# Patient Record
Sex: FEMALE | Race: AMERICAN INDIAN OR ALASKA NATIVE
[De-identification: names, ages, dates, MRNs, and addresses within clinical notes are randomized per-mention and may not be internally consistent; named-entity substitution may affect disease eponyms.]

---

## 2019-02-23 ENCOUNTER — HOSPITAL ENCOUNTER (EMERGENCY)
Dept: HOSPITAL 5 - ED | Age: 54
Discharge: HOME | End: 2019-02-23
Payer: MEDICAID

## 2019-02-23 VITALS — SYSTOLIC BLOOD PRESSURE: 130 MMHG | DIASTOLIC BLOOD PRESSURE: 89 MMHG

## 2019-02-23 DIAGNOSIS — K59.00: ICD-10-CM

## 2019-02-23 DIAGNOSIS — I10: ICD-10-CM

## 2019-02-23 DIAGNOSIS — J44.9: ICD-10-CM

## 2019-02-23 DIAGNOSIS — E11.9: ICD-10-CM

## 2019-02-23 DIAGNOSIS — F31.9: ICD-10-CM

## 2019-02-23 DIAGNOSIS — N76.0: Primary | ICD-10-CM

## 2019-02-23 DIAGNOSIS — M19.90: ICD-10-CM

## 2019-02-23 DIAGNOSIS — Z88.1: ICD-10-CM

## 2019-02-23 LAB
BACTERIA #/AREA URNS HPF: (no result) /HPF
BILIRUB UR QL STRIP: (no result)
BLOOD UR QL VISUAL: (no result)
HYALINE CASTS #/AREA URNS LPF: 4 /LPF
MUCOUS THREADS #/AREA URNS HPF: (no result) /HPF
PH UR STRIP: 5 [PH] (ref 5–7)
RBC #/AREA URNS HPF: 13 /HPF (ref 0–6)
UROBILINOGEN UR-MCNC: 4 MG/DL (ref ?–2)
WBC #/AREA URNS HPF: 8 /HPF (ref 0–6)

## 2019-02-23 PROCEDURE — 81001 URINALYSIS AUTO W/SCOPE: CPT

## 2019-02-23 PROCEDURE — 99283 EMERGENCY DEPT VISIT LOW MDM: CPT

## 2019-02-23 NOTE — EMERGENCY DEPARTMENT REPORT
Chief Complaint: Urogenital-Female


Stated Complaint: DISCHARGE/DISCOMFORT


Time Seen by Provider: 02/23/19 15:04





- HPI


History of Present Illness: 





SUPRAPUBIC PAIN 


VAG DX


THINKS YEAST AND BV


NO FEVER


NOT SEXUALLY ACTIVE





PMH


CMO


DM


HTN


ARTHRITIS


CEREBRAL ANEURYSM





RX


SEE PC





MSE DONE


MSE screening note: 


Focused history and physical exam performed.


Due to findings the following was ordered:











ED Disposition for MSE


Condition: Stable

## 2019-02-23 NOTE — EMERGENCY DEPARTMENT REPORT
ED Female  HPI





- General


Chief complaint: Urogenital-Female


Stated complaint: DISCHARGE/DISCOMFORT


Time Seen by Provider: 02/23/19 15:04


Source: patient


Mode of arrival: Ambulatory


Limitations: No Limitations





- History of Present Illness


Initial comments: 





Ms. Garcia is a very pleasant 52 yo female who presents with vaginal discharge

and irritation.  She is concerned for yeast infection or BV.  No abdominal pain.

 Has had constipation.


MD Complaint: vaginal discharge


-: days(s) (3)


Severity: mild


Quality: other (vaginal irritation)


Consistency: constant


Improves with: none


Worsens with: none


Are you Pregnant Now?: No





- Related Data


                                  Previous Rx's











 Medication  Instructions  Recorded  Last Taken  Type


 


Gabapentin [Neurontin] 300 mg PO TID #90 capsule 04/12/16 Unknown Rx


 


Lurasidone HCl [Latuda] 80 mg PO QDAY #30 tablet 04/12/16 Unknown Rx


 


Meloxicam 15 mg PO QDAY #10 tablet 04/12/16 Unknown Rx


 


QUEtiapine [SEROquel] 200 mg PO BID #60 tablet 04/12/16 Unknown Rx


 


amLODIPine [Norvasc] 2.5 mg PO QHS #15 tab 04/12/16 Unknown Rx


 


hydroCHLOROthiazide [HCTZ] 12.5 mg PO QDAY #30 capsule 04/12/16 Unknown Rx


 


metFORMIN [Glucophage] 500 mg PO BID #60 tablet 04/12/16 Unknown Rx


 


traZODone [Desyrel] 100 mg PO BID #20 tablet 04/12/16 Unknown Rx


 


Fluconazole [Diflucan TAB] 150 mg PO ONCE #1 tablet 02/23/19 Unknown Rx


 


metroNIDAZOLE [Flagyl] 500 mg PO Q12HR 7 Days #14 tab 02/23/19 Unknown Rx











                                    Allergies











Allergy/AdvReac Type Severity Reaction Status Date / Time


 


amoxicillin AdvReac  Shortness Verified 04/08/16 13:51





   of Breath  


 


erythromycin base AdvReac  Shortness Verified 04/08/16 13:51





   of Breath  


 


Tetracyclines AdvReac  Shortness Verified 04/08/16 13:51





   of Breath  


 


vancomycin AdvReac  Shortness Verified 04/08/16 13:51





   of Breath  














ED Review of Systems


ROS: 


Stated complaint: DISCHARGE/DISCOMFORT


Other details as noted in HPI





Constitutional: denies: fever, malaise


Gastrointestinal: constipation.  denies: abdominal pain, vomiting





ED Past Medical Hx





- Past Medical History


Hx Hypertension: Yes


Hx Heart Attack/AMI: No


Hx Diabetes: Yes


Hx Arthritis: Yes


Hx Psychiatric Treatment: Yes (bipolar, depression)


Hx Asthma: Yes


Hx COPD: Yes





- Surgical History


Additional Surgical History: hand and foot repair.  carpal tunnel repair





- Social History


Smoking Status: Never Smoker


Substance Use Type: None





- Medications


Home Medications: 


                                Home Medications











 Medication  Instructions  Recorded  Confirmed  Last Taken  Type


 


Gabapentin [Neurontin] 300 mg PO TID #90 capsule 04/12/16  Unknown Rx


 


Lurasidone HCl [Latuda] 80 mg PO QDAY #30 tablet 04/12/16  Unknown Rx


 


Meloxicam 15 mg PO QDAY #10 tablet 04/12/16  Unknown Rx


 


QUEtiapine [SEROquel] 200 mg PO BID #60 tablet 04/12/16  Unknown Rx


 


amLODIPine [Norvasc] 2.5 mg PO QHS #15 tab 04/12/16  Unknown Rx


 


hydroCHLOROthiazide [HCTZ] 12.5 mg PO QDAY #30 capsule 04/12/16  Unknown Rx


 


metFORMIN [Glucophage] 500 mg PO BID #60 tablet 04/12/16  Unknown Rx


 


traZODone [Desyrel] 100 mg PO BID #20 tablet 04/12/16  Unknown Rx


 


Fluconazole [Diflucan TAB] 150 mg PO ONCE #1 tablet 02/23/19  Unknown Rx


 


metroNIDAZOLE [Flagyl] 500 mg PO Q12HR 7 Days #14 tab 02/23/19  Unknown Rx














ED Physical Exam





- General


Limitations: No Limitations


General appearance: alert, in no apparent distress





- Head


Head exam: Present: atraumatic, normocephalic





- Eye


Eye exam: Present: normal appearance





- ENT


ENT exam: Present: mucous membranes moist





- Neck


Neck exam: Present: normal inspection, full ROM





- Respiratory


Respiratory exam: Present: normal lung sounds bilaterally.  Absent: respiratory 

distress, wheezes, rales





- GI/Abdominal


GI/Abdominal exam: Present: soft.  Absent: distended, tenderness, guarding, 

rebound





- Neurological Exam


Neurological exam: Present: alert, oriented X3





- Psychiatric


Psychiatric exam: Present: normal affect, normal mood





ED Course


                                   Vital Signs











  02/23/19





  15:05


 


Pulse Rate 95 H


 


Respiratory 16





Rate 


 


Blood Pressure 130/89


 


O2 Sat by Pulse 95





Oximetry 














ED Medical Decision Making





- Medical Decision Making





Vaginitis:  Rx: fluconazole, metronidazole


Critical care attestation.: 


If time is entered above; I have spent that time in minutes in the direct care 

of this critically ill patient, excluding procedure time.








ED Disposition


Clinical Impression: 


 Vaginitis





Disposition: DC-01 TO HOME OR SELFCARE


Is pt being admited?: No


Does the pt Need Aspirin: No


Condition: Stable


Instructions:  Bacterial Vaginosis (ED), Vulvovaginal Candidiasis (ED)


Prescriptions: 


Fluconazole [Diflucan TAB] 150 mg PO ONCE #1 tablet


metroNIDAZOLE [Flagyl] 500 mg PO Q12HR 7 Days #14 tab

## 2019-07-08 ENCOUNTER — HOSPITAL ENCOUNTER (EMERGENCY)
Dept: HOSPITAL 5 - ED | Age: 54
Discharge: HOME | End: 2019-07-08
Payer: MEDICAID

## 2019-07-08 VITALS — SYSTOLIC BLOOD PRESSURE: 147 MMHG | DIASTOLIC BLOOD PRESSURE: 87 MMHG

## 2019-07-08 DIAGNOSIS — E11.9: ICD-10-CM

## 2019-07-08 DIAGNOSIS — R11.2: ICD-10-CM

## 2019-07-08 DIAGNOSIS — Z79.899: ICD-10-CM

## 2019-07-08 DIAGNOSIS — Z88.1: ICD-10-CM

## 2019-07-08 DIAGNOSIS — M19.90: ICD-10-CM

## 2019-07-08 DIAGNOSIS — I10: ICD-10-CM

## 2019-07-08 DIAGNOSIS — G89.29: ICD-10-CM

## 2019-07-08 DIAGNOSIS — Z98.890: ICD-10-CM

## 2019-07-08 DIAGNOSIS — F31.9: ICD-10-CM

## 2019-07-08 DIAGNOSIS — R51: Primary | ICD-10-CM

## 2019-07-08 DIAGNOSIS — J44.9: ICD-10-CM

## 2019-07-08 DIAGNOSIS — E78.00: ICD-10-CM

## 2019-07-08 LAB
BASOPHILS # (AUTO): 0.1 K/MM3 (ref 0–0.1)
BASOPHILS NFR BLD AUTO: 1.2 % (ref 0–1.8)
BUN SERPL-MCNC: 15 MG/DL (ref 7–17)
BUN/CREAT SERPL: 17 %
CALCIUM SERPL-MCNC: 9.6 MG/DL (ref 8.4–10.2)
EOSINOPHIL # BLD AUTO: 0.2 K/MM3 (ref 0–0.4)
EOSINOPHIL NFR BLD AUTO: 2 % (ref 0–4.3)
HCT VFR BLD CALC: 39.1 % (ref 30.3–42.9)
HEMOLYSIS INDEX: 9
HGB BLD-MCNC: 13.4 GM/DL (ref 10.1–14.3)
LYMPHOCYTES # BLD AUTO: 4.5 K/MM3 (ref 1.2–5.4)
LYMPHOCYTES NFR BLD AUTO: 42.6 % (ref 13.4–35)
MCHC RBC AUTO-ENTMCNC: 34 % (ref 30–34)
MCV RBC AUTO: 94 FL (ref 79–97)
MONOCYTES # (AUTO): 0.7 K/MM3 (ref 0–0.8)
MONOCYTES % (AUTO): 6.3 % (ref 0–7.3)
PLATELET # BLD: 317 K/MM3 (ref 140–440)
RBC # BLD AUTO: 4.17 M/MM3 (ref 3.65–5.03)

## 2019-07-08 PROCEDURE — 99284 EMERGENCY DEPT VISIT MOD MDM: CPT

## 2019-07-08 PROCEDURE — 36415 COLL VENOUS BLD VENIPUNCTURE: CPT

## 2019-07-08 PROCEDURE — 93005 ELECTROCARDIOGRAM TRACING: CPT

## 2019-07-08 PROCEDURE — 71046 X-RAY EXAM CHEST 2 VIEWS: CPT

## 2019-07-08 PROCEDURE — 80048 BASIC METABOLIC PNL TOTAL CA: CPT

## 2019-07-08 PROCEDURE — 84484 ASSAY OF TROPONIN QUANT: CPT

## 2019-07-08 PROCEDURE — 85025 COMPLETE CBC W/AUTO DIFF WBC: CPT

## 2019-07-08 PROCEDURE — 93010 ELECTROCARDIOGRAM REPORT: CPT

## 2019-07-08 PROCEDURE — 70450 CT HEAD/BRAIN W/O DYE: CPT

## 2019-07-08 NOTE — EVENT NOTE
ED Screening Note


ED Screening Note: 








pt presents for a diffuse HA that began last night 


tingling/throbbing


N/V


PMHx of aneurysm 2016, had a coil placed, bipolar, depression, COPD, DM


no numbness or weakness


no vision changes








This initial assessment/diagnostic orders/clinical plan/treatment(s) is/are 

subject to change based on patients health status, clinical progression and re-

assessment by fellow clinical providers in the ED. Further treatment and workup 

at subsequent clinical providers discretion. Patient/guardian urged not to elope

from the ED as their condition may be serious if not clinically assessed and 

managed. 





Initial orders include: CT head

## 2019-07-08 NOTE — EMERGENCY DEPARTMENT REPORT
ED Headache HPI





- General


Chief Complaint: Headache


Stated Complaint: HEADACHE


Time Seen by Provider: 07/08/19 13:17


Source: patient


Exam Limitations: no limitations





- History of Present Illness


Initial Comments: 





h/o headaches, presents to ER for headaches for days, no fever, no neck pain, 

pain is moderate, without radiation. no alleviating or exacerbating factors. has

not taken any meds for symptoms. no recent head trauma.


Timing/Duration: 1 week, decreasing, waxing and waning


Quality: moderate


Head Injury Location: frontal, temporal


Recent Head Trauma: frequent headaches, chronic headaches


Modifying Factors: improves with: exposure to light.  worse with: cold therapy


Associated Symptoms: nausea/vomiting


Allergies/Adverse Reactions: 


Allergies





amoxicillin Adverse Reaction (Verified 04/08/16 13:51)


   Shortness of Breath


erythromycin base Adverse Reaction (Verified 04/08/16 13:51)


   Shortness of Breath


Tetracyclines Adverse Reaction (Verified 04/08/16 13:51)


   Shortness of Breath


vancomycin Adverse Reaction (Verified 04/08/16 13:51)


   Shortness of Breath








Home Medications: 


Ambulatory Orders





Gabapentin [Neurontin] 300 mg PO TID #90 capsule 04/12/16 


Lurasidone HCl [Latuda] 80 mg PO QDAY #30 tablet 04/12/16 


Meloxicam 15 mg PO QDAY #10 tablet 04/12/16 


QUEtiapine [SEROquel] 200 mg PO BID #60 tablet 04/12/16 


amLODIPine [Norvasc] 2.5 mg PO QHS #15 tab 04/12/16 


hydroCHLOROthiazide [HCTZ] 12.5 mg PO QDAY #30 capsule 04/12/16 


metFORMIN [Glucophage] 500 mg PO BID #60 tablet 04/12/16 


traZODone [Desyrel] 100 mg PO BID #20 tablet 04/12/16 


Fluconazole [Diflucan TAB] 150 mg PO ONCE #1 tablet 02/23/19 


metroNIDAZOLE [Flagyl] 500 mg PO Q12HR 7 Days #14 tab 02/23/19 


Ketorolac [Toradol] 10 mg PO Q6H PRN #12 tablet 07/08/19 


Promethazine [Phenergan] 25 mg PO Q6HR PRN #15 tab 07/08/19 











ED Review of Systems


ROS: 


Stated complaint: HEADACHE


Other details as noted in HPI





Comment: All other systems reviewed and negative


Respiratory: denies: cough, orthopnea


Cardiovascular: denies: chest pain, palpitations


Endocrine: denies: excessive sweating


Gastrointestinal: denies: nausea, vomiting


Neurological: headache





ED Past Medical Hx





- Past Medical History


Previous Medical History?: Yes


Hx Hypertension: Yes


Hx Heart Attack/AMI: No


Hx Diabetes: Yes


Hx Arthritis: Yes


Hx Psychiatric Treatment: Yes (bipolar, depression)


Hx Asthma: Yes


Hx COPD: Yes


Additional medical history: aneurysm, elevated cholesterol





- Surgical History


Past Surgical History?: Yes


Additional Surgical History: hand and foot repair.  carpal tunnel repair.  

aneurysm





- Social History


Smoking Status: Never Smoker


Substance Use Type: None





- Medications


Home Medications: 


                                Home Medications











 Medication  Instructions  Recorded  Confirmed  Last Taken  Type


 


Gabapentin [Neurontin] 300 mg PO TID #90 capsule 04/12/16  Unknown Rx


 


Lurasidone HCl [Latuda] 80 mg PO QDAY #30 tablet 04/12/16  Unknown Rx


 


Meloxicam 15 mg PO QDAY #10 tablet 04/12/16  Unknown Rx


 


QUEtiapine [SEROquel] 200 mg PO BID #60 tablet 04/12/16  Unknown Rx


 


amLODIPine [Norvasc] 2.5 mg PO QHS #15 tab 04/12/16  Unknown Rx


 


hydroCHLOROthiazide [HCTZ] 12.5 mg PO QDAY #30 capsule 04/12/16  Unknown Rx


 


metFORMIN [Glucophage] 500 mg PO BID #60 tablet 04/12/16  Unknown Rx


 


traZODone [Desyrel] 100 mg PO BID #20 tablet 04/12/16  Unknown Rx


 


Fluconazole [Diflucan TAB] 150 mg PO ONCE #1 tablet 02/23/19  Unknown Rx


 


metroNIDAZOLE [Flagyl] 500 mg PO Q12HR 7 Days #14 tab 02/23/19  Unknown Rx


 


Ketorolac [Toradol] 10 mg PO Q6H PRN #12 tablet 07/08/19  Unknown Rx


 


Promethazine [Phenergan] 25 mg PO Q6HR PRN #15 tab 07/08/19  Unknown Rx














ED Physical Exam





- General


Limitations: No Limitations


General appearance: alert, in no apparent distress





- Head


Head exam: Present: atraumatic, normocephalic





- Eye


Eye exam: Present: normal appearance, PERRL, EOMI


Pupils: Present: normal accommodation





- ENT


ENT exam: Present: normal exam





- Neck


Neck exam: Present: normal inspection





- Respiratory


Respiratory exam: Present: normal lung sounds bilaterally





- Cardiovascular


Cardiovascular Exam: Present: regular rate, normal rhythm





- GI/Abdominal


GI/Abdominal exam: Present: soft, normal bowel sounds





- Rectal


Rectal exam: Present: deferred





- Extremities Exam


Extremities exam: Present: normal inspection, full ROM





- Back Exam


Back exam: Present: normal inspection





- Neurological Exam


Neurological exam: Present: alert, oriented X3, CN II-XII intact





- Psychiatric


Psychiatric exam: Present: normal affect, normal mood





- Skin


Skin exam: Present: warm





ED Course


                                   Vital Signs











  07/08/19 07/08/19





  13:18 18:21


 


Temperature 98.1 F 


 


Pulse Rate 89 


 


Respiratory 18 18





Rate  


 


Blood Pressure 147/87 


 


O2 Sat by Pulse 100 





Oximetry  














ED Medical Decision Making





- Lab Data


Result diagrams: 


                                 07/08/19 15:14





                                 07/08/19 15:14


Critical care attestation.: 


If time is entered above; I have spent that time in minutes in the direct care 

of this critically ill patient, excluding procedure time.








ED Disposition


Clinical Impression: 


Headache


Qualifiers:


 Headache type: tension-type Headache chronicity pattern: acute headache 

Intractability: not intractable Qualified Code(s): G44.209 - Tension-type 

headache, unspecified, not intractable





Disposition: DC-01 TO HOME OR SELFCARE


Is pt being admited?: No


Does the pt Need Aspirin: No


Condition: Stable


Instructions:  Acute Headache (ED)


Prescriptions: 


Promethazine [Phenergan] 25 mg PO Q6HR PRN #15 tab


 PRN Reason: Nausea


Ketorolac [Toradol] 10 mg PO Q6H PRN #12 tablet


 PRN Reason: Pain


Referrals: 


[facility], [place] [Primary Care Provider] - 3-5 Days

## 2019-07-08 NOTE — XRAY REPORT
CHEST 2 VIEWS 



INDICATION: 

CP.



COMPARISON: 

None.



FINDINGS:

Support devices: None.



Heart: Within normal limits. 

Lungs/Pleura: No acute air space or interstitial disease.   No significant pleural effusion. 



IMPRESSION:  No acute findings.



Signer Name: Yeison Oquendo MD 

Signed: 7/8/2019 2:04 PM

 Workstation Name: [id number]

## 2019-07-08 NOTE — CAT SCAN REPORT
CT head without contrast



INDICATION : History of aneurysm, dizziness and headache for one week, nausea and vomiting for one ye
ar.



TECHNIQUE:  Axial imaging performed from the skull apex through the skull base without the use of con
trast.  All CT scans at this location are performed using CT dose reduction for ALARA by means of aut
omated exposure control. 



COMPARISON:  4/8/2016



FINDINGS:  



Parenchyma:  No acute intracranial hemorrhage or parenchymal abnormality. There is been interval coil
ing of an aneurysm along the right side of the [place] since the previous exam. This appears
 to represent an ICA terminus aneurysm.

Ventricles:  Ventricles are normal in size and appear symmetric.   

Soft tissues:  Soft tissues including the orbits appear normal.   

Bones:  No acute osseous abnormality.   

Sinuses:  Sinuses and mastoid air cells are clear.





IMPRESSION: No acute abnormality.



Signer Name: Simone Friedman Jr, MD 

Signed: 7/8/2019 2:33 PM

 Workstation Name: [id number]

## 2019-08-27 ENCOUNTER — HOSPITAL ENCOUNTER (OUTPATIENT)
Dept: HOSPITAL 5 - CT | Age: 54
Discharge: HOME | End: 2019-08-27
Attending: PAIN MEDICINE
Payer: MEDICAID

## 2019-08-27 DIAGNOSIS — M51.27: Primary | ICD-10-CM

## 2019-08-27 PROCEDURE — 72131 CT LUMBAR SPINE W/O DYE: CPT

## 2019-08-27 NOTE — CAT SCAN REPORT
CT LUMBAR SPINE WITHOUT CONTRAST  

 

HISTORY: Back pain; leg pain

 

COMPARISON: None 



TECHNIQUE: CT images of the lumbar spine were obtained without contrast.  Sagittal and coronal reform
ats were post-processed. All CT scans at this location are performed using CT dose reduction for ALAR
A by means of automated exposure control.



CONTRAST: None.

 

FINDINGS:

Alignment: Normal. No traumatic subluxation.

Vertebrae:No significant abnormality. No fracture.  

Disc Spaces: T12-L1 disc space is normal.



L1-L2 disc space is normal.

Accumulation of gas is seen in the intradural vertebral discs at L2-L3, L3-L4, L4-L5 and L5-S1 disc l
evels due to degenerative changes.

At L2-L3 disc level, broad-based disc protrusion is seen extending bilaterally more to the right side
. The lateral bony spur is seen on the right side. Neuroforamina are narrowed.

At L3-L4 disc level, broad-based disc protrusion is seen extending laterally bilaterally more to the 
left neuroforamina are narrowed.. Disc material is extruding superiorly.

At L4-L5 disc level, broad-based disc protrusion is seen extending laterally bilaterally. Both neurof
oramina are narrowed. Facet joint degenerative changes are seen bilaterally.

At L5-S1 disc level, broad-based disc protrusion is seen with calcified annulus. Both lateral recesse
s are stenotic. Neuroforamina are narrowed..



Facet Joints:Facet joint hypertrophic degenerative changes are seen bilaterally in the last 2 lumbar 
disc levels.



Additional Findings: None

 

IMPRESSION:

 

1.  Soft disc protrusions at L2-L3, L3-L4, L4-L5 and L5-S1 disc levels with foraminal stenoses



Signer Name: Priscila Murcia MD 

Signed: 8/27/2019 12:00 PM

 Workstation Name: [id number]